# Patient Record
Sex: MALE | Race: OTHER | NOT HISPANIC OR LATINO | URBAN - METROPOLITAN AREA
[De-identification: names, ages, dates, MRNs, and addresses within clinical notes are randomized per-mention and may not be internally consistent; named-entity substitution may affect disease eponyms.]

---

## 2017-12-31 ENCOUNTER — EMERGENCY (EMERGENCY)
Facility: HOSPITAL | Age: 37
LOS: 1 days | Discharge: HOME | End: 2017-12-31
Admitting: EMERGENCY MEDICINE
Payer: MEDICAID

## 2017-12-31 VITALS
HEIGHT: 74 IN | TEMPERATURE: 98 F | OXYGEN SATURATION: 100 % | DIASTOLIC BLOOD PRESSURE: 88 MMHG | WEIGHT: 167.99 LBS | RESPIRATION RATE: 18 BRPM | HEART RATE: 82 BPM | SYSTOLIC BLOOD PRESSURE: 142 MMHG

## 2017-12-31 DIAGNOSIS — H57.9 UNSPECIFIED DISORDER OF EYE AND ADNEXA: ICD-10-CM

## 2017-12-31 DIAGNOSIS — B20 HUMAN IMMUNODEFICIENCY VIRUS [HIV] DISEASE: ICD-10-CM

## 2017-12-31 PROCEDURE — 99282 EMERGENCY DEPT VISIT SF MDM: CPT | Mod: 25

## 2017-12-31 NOTE — ED PROVIDER NOTE - PHYSICAL EXAMINATION
VITAL SIGNS: I have reviewed nursing notes and confirm.  CONSTITUTIONAL: Well-developed; well-nourished; in no acute distress.  SKIN: Skin is warm and dry, no acute rash.  HEAD: Normocephalic; atraumatic.  EYES: PERRL, EOM intact; conjunctiva and sclera clear. Otoscopic exam normal. no nicking or cupping. 20/20 vison in bilateral eyes on snellen chart from 4 feet away  ENT: No nasal discharge; airway clear.  NECK: Supple; non tender.  CARD: S1, S2 normal; no murmurs, gallops, or rubs. Regular rate and rhythm.  RESP: No wheezes, rales or rhonchi.  ABD: Normal bowel sounds; soft; non-distended; non-tender; no hepatosplenomegaly.  EXT: Normal ROM. No clubbing, cyanosis or edema.  NEURO: Alert, oriented. Grossly unremarkable. CNs intact. Normal Romberg. Normal finger to nose. No pronator drift. Normal rapid alternating movements/heal to shin. Sensation intact to all extremities. 5/5 strength to all extremities.   PSYCH: Cooperative, appropriate.

## 2017-12-31 NOTE — ED PROVIDER NOTE - OBJECTIVE STATEMENT
36 y/o M    PMHx: HIV -VL undetectable    Pt presents with c/o 1 day of noticing "a difference in the way my eyes reflect light when I look at them in the mirror". States "one reflect more light than the other and that never used to be the case". Denies any visual changes, blurring, eye pain, headache, gait/balance changes, foreign body in eye, eye trauma, discharge. Pt states he used to wear contacts for years- but has not worn them in a few years

## 2017-12-31 NOTE — ED ADULT NURSE NOTE - NS ED NURSE DC INFO COMPLEXITY
Simple: Patient demonstrates quick and easy understanding/Verbalized Understanding D/C instructions not given

## 2017-12-31 NOTE — ED PROVIDER NOTE - MEDICAL DECISION MAKING DETAILS
change in eye reflection? exam normal. pt to follow up with ophthalmology this week. urged to return if symptoms worsen or change

## 2019-04-19 ENCOUNTER — EMERGENCY (EMERGENCY)
Facility: HOSPITAL | Age: 39
LOS: 1 days | Discharge: HOME | End: 2019-04-19
Attending: EMERGENCY MEDICINE | Admitting: EMERGENCY MEDICINE
Payer: MEDICAID

## 2019-04-19 VITALS
RESPIRATION RATE: 18 BRPM | SYSTOLIC BLOOD PRESSURE: 125 MMHG | DIASTOLIC BLOOD PRESSURE: 88 MMHG | HEART RATE: 87 BPM | OXYGEN SATURATION: 100 % | TEMPERATURE: 98 F

## 2019-04-19 DIAGNOSIS — B20 HUMAN IMMUNODEFICIENCY VIRUS [HIV] DISEASE: ICD-10-CM

## 2019-04-19 PROCEDURE — 99283 EMERGENCY DEPT VISIT LOW MDM: CPT

## 2019-04-19 RX ORDER — ABACAVIR 20 MG/ML
600 SOLUTION ORAL ONCE
Qty: 0 | Refills: 0 | Status: DISCONTINUED | OUTPATIENT
Start: 2019-04-19 | End: 2019-04-23

## 2019-04-19 RX ORDER — LAMIVUDINE 150 MG
300 TABLET ORAL ONCE
Qty: 0 | Refills: 0 | Status: DISCONTINUED | OUTPATIENT
Start: 2019-04-19 | End: 2019-04-23

## 2019-04-19 NOTE — ED ADULT NURSE NOTE - CHPI ED NUR SYMPTOMS NEG
no vomiting/no fever/no nausea/no decreased eating/drinking/no tingling/no weakness/no pain/no chills/no dizziness

## 2019-04-19 NOTE — ED PROVIDER NOTE - CLINICAL SUMMARY MEDICAL DECISION MAKING FREE TEXT BOX
Pt with missed dose of HIV medication, Triumeq, with no other acute complaints. Will give Pt a dose of Abacavir 600mg and Lamivudine 300mg in ED and release to PD. Dolutegravir component of Pt's medication is not in stock in ED.

## 2019-04-19 NOTE — ED PROVIDER NOTE - OBJECTIVE STATEMENT
37 y/o male with PMHx of HIV, BIB EMS, accompanied by PD presents to the ED requesting for dose of HIV medication Triumeq . Pt states he takes his medication every day at 5pm and missed his dose today. He is also requesting for a record of his last visit in ED. No other complaints. Denies fever, chills.

## 2021-03-12 ENCOUNTER — EMERGENCY (EMERGENCY)
Facility: HOSPITAL | Age: 41
LOS: 1 days | Discharge: HOME | End: 2021-03-12
Attending: EMERGENCY MEDICINE | Admitting: EMERGENCY MEDICINE
Payer: MEDICAID

## 2021-03-12 VITALS
RESPIRATION RATE: 16 BRPM | OXYGEN SATURATION: 100 % | TEMPERATURE: 98 F | DIASTOLIC BLOOD PRESSURE: 76 MMHG | HEART RATE: 73 BPM | SYSTOLIC BLOOD PRESSURE: 101 MMHG | HEIGHT: 74 IN | WEIGHT: 169.98 LBS

## 2021-03-12 VITALS
OXYGEN SATURATION: 97 % | HEIGHT: 74 IN | TEMPERATURE: 98 F | RESPIRATION RATE: 18 BRPM | HEART RATE: 88 BPM | DIASTOLIC BLOOD PRESSURE: 78 MMHG | SYSTOLIC BLOOD PRESSURE: 115 MMHG | WEIGHT: 179.9 LBS

## 2021-03-12 DIAGNOSIS — B20 HUMAN IMMUNODEFICIENCY VIRUS [HIV] DISEASE: ICD-10-CM

## 2021-03-12 DIAGNOSIS — R46.89 OTHER SYMPTOMS AND SIGNS INVOLVING APPEARANCE AND BEHAVIOR: ICD-10-CM

## 2021-03-12 DIAGNOSIS — Z20.822 CONTACT WITH AND (SUSPECTED) EXPOSURE TO COVID-19: ICD-10-CM

## 2021-03-12 LAB
ALBUMIN SERPL ELPH-MCNC: 3.9 G/DL — SIGNIFICANT CHANGE UP (ref 3.4–5)
ALP SERPL-CCNC: 54 U/L — SIGNIFICANT CHANGE UP (ref 40–120)
ALT FLD-CCNC: 25 U/L — SIGNIFICANT CHANGE UP (ref 12–42)
ANION GAP SERPL CALC-SCNC: 9 MMOL/L — SIGNIFICANT CHANGE UP (ref 9–16)
ANISOCYTOSIS BLD QL: SLIGHT — SIGNIFICANT CHANGE UP
AST SERPL-CCNC: 28 U/L — SIGNIFICANT CHANGE UP (ref 15–37)
BASOPHILS # BLD AUTO: 0.06 K/UL — SIGNIFICANT CHANGE UP (ref 0–0.2)
BASOPHILS NFR BLD AUTO: 1.1 % — SIGNIFICANT CHANGE UP (ref 0–2)
BILIRUB SERPL-MCNC: 0.3 MG/DL — SIGNIFICANT CHANGE UP (ref 0.2–1.2)
BUN SERPL-MCNC: 18 MG/DL — SIGNIFICANT CHANGE UP (ref 7–23)
CALCIUM SERPL-MCNC: 9.2 MG/DL — SIGNIFICANT CHANGE UP (ref 8.5–10.5)
CHLORIDE SERPL-SCNC: 108 MMOL/L — SIGNIFICANT CHANGE UP (ref 96–108)
CO2 SERPL-SCNC: 28 MMOL/L — SIGNIFICANT CHANGE UP (ref 22–31)
CREAT SERPL-MCNC: 1.09 MG/DL — SIGNIFICANT CHANGE UP (ref 0.5–1.3)
ELLIPTOCYTES BLD QL SMEAR: SLIGHT — SIGNIFICANT CHANGE UP
EOSINOPHIL # BLD AUTO: 0.11 K/UL — SIGNIFICANT CHANGE UP (ref 0–0.5)
EOSINOPHIL NFR BLD AUTO: 2 % — SIGNIFICANT CHANGE UP (ref 0–6)
ETHANOL SERPL-MCNC: <3 MG/DL — SIGNIFICANT CHANGE UP
GLUCOSE SERPL-MCNC: 92 MG/DL — SIGNIFICANT CHANGE UP (ref 70–99)
HCT VFR BLD CALC: 37.7 % — LOW (ref 39–50)
HGB BLD-MCNC: 12 G/DL — LOW (ref 13–17)
IMM GRANULOCYTES NFR BLD AUTO: 0.4 % — SIGNIFICANT CHANGE UP (ref 0–1.5)
LYMPHOCYTES # BLD AUTO: 2.26 K/UL — SIGNIFICANT CHANGE UP (ref 1–3.3)
LYMPHOCYTES # BLD AUTO: 40.1 % — SIGNIFICANT CHANGE UP (ref 13–44)
MANUAL SMEAR VERIFICATION: SIGNIFICANT CHANGE UP
MCHC RBC-ENTMCNC: 23.8 PG — LOW (ref 27–34)
MCHC RBC-ENTMCNC: 31.8 GM/DL — LOW (ref 32–36)
MCV RBC AUTO: 74.7 FL — LOW (ref 80–100)
MONOCYTES # BLD AUTO: 0.48 K/UL — SIGNIFICANT CHANGE UP (ref 0–0.9)
MONOCYTES NFR BLD AUTO: 8.5 % — SIGNIFICANT CHANGE UP (ref 2–14)
NEUTROPHILS # BLD AUTO: 2.7 K/UL — SIGNIFICANT CHANGE UP (ref 1.8–7.4)
NEUTROPHILS NFR BLD AUTO: 47.9 % — SIGNIFICANT CHANGE UP (ref 43–77)
NRBC # BLD: 0 /100 WBCS — SIGNIFICANT CHANGE UP (ref 0–0)
PLAT MORPH BLD: NORMAL — SIGNIFICANT CHANGE UP
PLATELET # BLD AUTO: 246 K/UL — SIGNIFICANT CHANGE UP (ref 150–400)
POIKILOCYTOSIS BLD QL AUTO: SLIGHT — SIGNIFICANT CHANGE UP
POTASSIUM SERPL-MCNC: 3.8 MMOL/L — SIGNIFICANT CHANGE UP (ref 3.5–5.3)
POTASSIUM SERPL-SCNC: 3.8 MMOL/L — SIGNIFICANT CHANGE UP (ref 3.5–5.3)
PROT SERPL-MCNC: 7.3 G/DL — SIGNIFICANT CHANGE UP (ref 6.4–8.2)
RBC # BLD: 5.05 M/UL — SIGNIFICANT CHANGE UP (ref 4.2–5.8)
RBC # FLD: 13.8 % — SIGNIFICANT CHANGE UP (ref 10.3–14.5)
RBC BLD AUTO: ABNORMAL
SARS-COV-2 RNA SPEC QL NAA+PROBE: SIGNIFICANT CHANGE UP
SODIUM SERPL-SCNC: 145 MMOL/L — SIGNIFICANT CHANGE UP (ref 132–145)
TARGETS BLD QL SMEAR: SLIGHT — SIGNIFICANT CHANGE UP
TSH SERPL-MCNC: 0.93 UIU/ML — SIGNIFICANT CHANGE UP (ref 0.36–3.74)
WBC # BLD: 5.63 K/UL — SIGNIFICANT CHANGE UP (ref 3.8–10.5)
WBC # FLD AUTO: 5.63 K/UL — SIGNIFICANT CHANGE UP (ref 3.8–10.5)

## 2021-03-12 PROCEDURE — 99284 EMERGENCY DEPT VISIT MOD MDM: CPT

## 2021-03-12 PROCEDURE — 93010 ELECTROCARDIOGRAM REPORT: CPT

## 2021-03-12 PROCEDURE — 99283 EMERGENCY DEPT VISIT LOW MDM: CPT

## 2021-03-12 RX ORDER — IBUPROFEN 200 MG
600 TABLET ORAL ONCE
Refills: 0 | Status: COMPLETED | OUTPATIENT
Start: 2021-03-12 | End: 2021-03-12

## 2021-03-12 RX ADMIN — Medication 600 MILLIGRAM(S): at 20:29

## 2021-03-12 NOTE — ED PROVIDER NOTE - PHYSICAL EXAMINATION
Physical Exam  GEN: Awake, alert, non-toxic appearing, NCAT  RESP: no tachypnea, no hypoxia, no resp distress,  MSK: no deformity  SKIN: cap refill < 2 sec, pedal pulses palpable equal bl, no discoloration/erythema/cyanosis/gangrene/induration/fluctuance/crepitus/blisters

## 2021-03-12 NOTE — ED ADULT NURSE NOTE - OBJECTIVE STATEMENT
40y male presents to ED c/o headache. Pt is poor historian, rambling story about hotels and court systems, disconnected logic. Paranoid delusions. Pt denies psych hx but states he recently spent 10 days in a psych [facility] because "the doctors wanted to be mean to me." Pt mentioned hx of HIV. A&Ox4.

## 2021-03-12 NOTE — ED PROVIDER NOTE - NSFOLLOWUPINSTRUCTIONS_ED_ALL_ED_FT
Follow up with your primary care doctor or clinics listed below if you do [place] [address]  To make an appointment, call [phone]   Return immediately for any new or worsening symptoms or any new concerns

## 2021-03-12 NOTE — ED PROVIDER NOTE - PATIENT PORTAL LINK FT
You can access the FollowMyHealth Patient Portal offered by [facility] by registering at the following website: [email or web address]. By joining [employer]’s FollowMyHealth portal, you will also be able to view your health information using other applications (apps) compatible with our system.

## 2021-03-12 NOTE — ED PROVIDER NOTE - CARE PLAN
Principal Discharge DX:	Disorganized behavior   Principal Discharge DX:	Behavior symptom  Secondary Diagnosis:	Disorganized behavior

## 2021-03-12 NOTE — ED PROVIDER NOTE - PHYSICAL EXAMINATION
CONSTITUTIONAL: Well-appearing; well-nourished; in no apparent distress.   	HEAD: Normocephalic; atraumatic.   	EYES:  clear bilaterally  ENT: airway patent  Resp breathing comfortably with no distress  PSYCHOLOGICAL: The patient’s mood and manner are rambling, appears manic or high, unclear if this is his new baseline

## 2021-03-12 NOTE — ED ADULT NURSE REASSESSMENT NOTE - NS ED NURSE REASSESS COMMENT FT1
Pt demanding food, yelling at staff. Reminded pt that waiting blood work results and can give food shortly. Pt states, "I'd rather leave so I can get food. I came here for food." Pt walks out with steady gait.

## 2021-03-12 NOTE — ED PROVIDER NOTE - CLINICAL SUMMARY MEDICAL DECISION MAKING FREE TEXT BOX
Patient presenting with disorganized behavior. Unclear if this is drug or psych or both. Patient agreed to labs. Appears to be taking care of himself otherwise. Patient presenting with odd/disorganized behavior. Unclear if this is drug or psych or both. Patient agreed to labs. Appears to be taking care of himself otherwise. He stasyed for labs then refused to stay. he says that he has important things to do tomorrow. Dn appear disorganized enough to hold against his well.

## 2021-03-12 NOTE — ED PROVIDER NOTE - CLINICAL SUMMARY MEDICAL DECISION MAKING FREE TEXT BOX
bl toe pain after wearing new shoes, no evidence of trauma/infection/nv compromise, analgesia  shoes appear in good condition (sneakers)

## 2021-03-12 NOTE — ED PROVIDER NOTE - OBJECTIVE STATEMENT
40 yom pw bl toe pain, after walking in new shoes.  pt states he walked from [address] [place] [address] to [place].  no trauma.  no bleeding.  pain began today.

## 2021-03-12 NOTE — ED PROVIDER NOTE - OBJECTIVE STATEMENT
40 M came to triage to have his body and blood checked. He also wants his vital signs checked. 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 he is rambling and tangential. Says that he used to serve in   and that he has a PHD. 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 he says that he owns a building [address] but that he can't stay there.     He has HIV but is not sure if that is true as  "[place] lies". He denies mental health hx. He seems to deny drug use as well. He is grossly psychotic and looks very clean ie. dn look homeless. Online he appears to have a linked in profile that shows past  service and a master degree. 40 M came to triage to have his body and blood checked. He also wants his vital signs checked. 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 he is rambling and tangential. Says that he used to serve in   and that he has a PHD. 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 He says that he owns a building on [address] but that he can't stay there.     He has HIV but is not sure if that is true as  "[place] community lies". He denies mental health hx. He seems to deny drug use as well. He is grossly psychotic (,possibly chronically) vs. high on stimulants  and looks very clean ie. dn look homeless. Online he appears to have a linked in profile that shows past  service and a master degree.

## 2021-03-12 NOTE — ED ADULT NURSE NOTE - CHIEF COMPLAINT QUOTE
Pt with c/o bilateral foot pain. States he just bought new shoes and walked from [place] to [place].

## 2021-03-12 NOTE — ED PROVIDER NOTE - NSFOLLOWUPINSTRUCTIONS_ED_ALL_ED_FT
Please see your PMD/HIV PMD for ongoing care.     Please come back to the Er for medical emergencies or crisis.     [facility] is a good referral line for crisis and substance abuse help.

## 2021-03-12 NOTE — ED PROVIDER NOTE - PATIENT PORTAL LINK FT
You can access the FollowMyHealth Patient Portal offered by [facility] by registering at the following website: [email or web address]. By joining [employer]’s FollowMyHealth portal, you will also be able to view your health information using other applications (apps) compatible with our system.

## 2021-03-12 NOTE — ED ADULT TRIAGE NOTE - CHIEF COMPLAINT QUOTE
Pt walks in c/o of headache from "something in his blood." [facility] causing headache. Pt PMH of psych history, does not want to speak to telepsych. Pt A+Ox3.

## 2021-03-16 DIAGNOSIS — M79.675 PAIN IN LEFT TOE(S): ICD-10-CM

## 2021-03-16 DIAGNOSIS — M79.674 PAIN IN RIGHT TOE(S): ICD-10-CM

## 2021-03-16 DIAGNOSIS — M79.89 OTHER SPECIFIED SOFT TISSUE DISORDERS: ICD-10-CM

## 2021-07-18 ENCOUNTER — EMERGENCY (EMERGENCY)
Facility: HOSPITAL | Age: 41
LOS: 1 days | Discharge: HOME | End: 2021-07-18
Attending: EMERGENCY MEDICINE | Admitting: EMERGENCY MEDICINE
Payer: MEDICAID

## 2021-07-18 VITALS
HEIGHT: 74 IN | RESPIRATION RATE: 16 BRPM | HEART RATE: 64 BPM | WEIGHT: 175.05 LBS | SYSTOLIC BLOOD PRESSURE: 116 MMHG | TEMPERATURE: 98 F | OXYGEN SATURATION: 97 % | DIASTOLIC BLOOD PRESSURE: 73 MMHG

## 2021-07-18 DIAGNOSIS — F17.200 NICOTINE DEPENDENCE, UNSPECIFIED, UNCOMPLICATED: ICD-10-CM

## 2021-07-18 DIAGNOSIS — M79.675 PAIN IN LEFT TOE(S): ICD-10-CM

## 2021-07-18 DIAGNOSIS — M79.674 PAIN IN RIGHT TOE(S): ICD-10-CM

## 2021-07-18 DIAGNOSIS — Z59.0 HOMELESSNESS: ICD-10-CM

## 2021-07-18 PROCEDURE — 99283 EMERGENCY DEPT VISIT LOW MDM: CPT

## 2021-07-18 SDOH — ECONOMIC STABILITY - HOUSING INSECURITY: HOMELESSNESS: Z59.0

## 2021-07-19 RX ORDER — IBUPROFEN 200 MG
600 TABLET ORAL ONCE
Refills: 0 | Status: COMPLETED | OUTPATIENT
Start: 2021-07-19 | End: 2021-07-19

## 2021-07-19 RX ADMIN — Medication 600 MILLIGRAM(S): at 00:14

## 2021-07-19 NOTE — ED PROVIDER NOTE - NSFOLLOWUPINSTRUCTIONS_ED_ALL_ED_FT
Medicines: The following medicines may be ordered for you:   •Acetaminophen decreases pain. Ask how much to take and how often to take it. Follow directions. Acetaminophen can cause liver damage if not taken correctly.      •NSAIDs decrease pain and prevent swelling. Ask your healthcare provider which medicine is right for you. Ask how much to take and when to take it. Take as directed. NSAIDs can cause stomach bleeding and kidney problems if not taken correctly.       •Pain relief cream decreases pain. Use this cream as directed.      •Take your medicine as directed. Contact your healthcare provider if you think your medicine is not helping or if you have side effects. Tell him of her if you are allergic to any medicine. Keep a list of the medicines, vitamins, and herbs you take. Include the amounts, and when and why you take them. Bring the list or the pill bottles to follow-up visits. Carry your medicine list with you in case of an emergency.      Follow up with your healthcare provider or specialist as directed: Write down your questions so you remember to ask them during your visits.     Self-care:   •Apply heat to help decrease pain. Use a heating pad or heat wrap. Apply heat for 20 to 30 minutes every 2 hours for as many days as directed.       •Rest as much as possible. Avoid activities that cause joint pain.       •Apply ice to help decrease swelling and pain. Ice may also help prevent tissue damage. Use an ice pack, or put crushed ice in a plastic bag. Cover it with a towel and place it on your painful joint for 15 to 20 minutes every hour or as directed.       •Support the joint with a brace or elastic wrap as directed.       •Elevate your joint above the level of your heart as often as you can to help decrease swelling and pain. Prop your painful joint on pillows or blankets to keep it elevated comfortably.       •Lose weight if you are overweight. Extra weight can put pressure on your joints and cause more pain. Ask your healthcare provider how much you should weigh. Ask him to help you create a weight loss plan.       •Exercise regularly to help improve joint movement and to decrease pain. Ask about the best exercise plan for you. Low-impact exercises can help take the pressure off your joints. Examples are walking, swimming, and water aerobics.

## 2021-07-19 NOTE — ED PROVIDER NOTE - PHYSICAL EXAMINATION
CONSTITUTIONAL: Well appearing, well nourished, awake, alert, oriented to person, place, time/situation and in no apparent distress.  ENT: Airway patent, Nasal mucosa clear. Mouth with normal mucosa.  EYES: Clear bilaterally.  RESPIRATORY: Breathing comfortably with normal RR.  MSK: Range of motion is not limited, no deformities noted.  NEURO: Alert and oriented, no focal deficits.  EXT: Normal ROM. No cyanosis or edema. Non-ttp all ext, distal pulses intact  SKIN: Skin normal color for race, warm, dry and intact. No evidence of rash.  PSYCH: Alert and oriented to person, place, time/situation. normal mood and affect. no apparent risk to self or others.

## 2021-07-19 NOTE — ED PROVIDER NOTE - OBJECTIVE STATEMENT
39 y/o M p/w pain to b/l great toes for 2mo. No redness/swelling. No trauma. Pt is homeless and "on his feet" throughout the day.

## 2021-07-19 NOTE — ED PROVIDER NOTE - CLINICAL SUMMARY MEDICAL DECISION MAKING FREE TEXT BOX
no e/o infectious process, normal exam, likely overuse injury, supportive care measures and f/u outpt.

## 2021-07-19 NOTE — ED PROVIDER NOTE - PATIENT PORTAL LINK FT
You can access the FollowMyHealth Patient Portal offered by [facility] by registering at the following website: [email or web address]. By joining [employer]’s FollowMyHealth portal, you will also be able to view your health information using other applications (apps) compatible with our system.

## 2022-08-21 ENCOUNTER — EMERGENCY (EMERGENCY)
Facility: HOSPITAL | Age: 42
LOS: 1 days | Discharge: HOME | End: 2022-08-21
Admitting: EMERGENCY MEDICINE

## 2022-08-21 VITALS
TEMPERATURE: 98 F | WEIGHT: 168.65 LBS | SYSTOLIC BLOOD PRESSURE: 114 MMHG | HEART RATE: 77 BPM | RESPIRATION RATE: 18 BRPM | OXYGEN SATURATION: 99 % | DIASTOLIC BLOOD PRESSURE: 74 MMHG | HEIGHT: 74 IN

## 2022-08-21 PROCEDURE — 99283 EMERGENCY DEPT VISIT LOW MDM: CPT

## 2022-08-21 RX ORDER — IBUPROFEN 200 MG
600 TABLET ORAL ONCE
Refills: 0 | Status: COMPLETED | OUTPATIENT
Start: 2022-08-21 | End: 2022-08-21

## 2022-08-21 RX ORDER — BACITRACIN ZINC 500 UNIT/G
1 OINTMENT IN PACKET (EA) TOPICAL ONCE
Refills: 0 | Status: COMPLETED | OUTPATIENT
Start: 2022-08-21 | End: 2022-08-21

## 2022-08-21 RX ORDER — ACETAMINOPHEN 500 MG
650 TABLET ORAL ONCE
Refills: 0 | Status: COMPLETED | OUTPATIENT
Start: 2022-08-21 | End: 2022-08-21

## 2022-08-21 RX ADMIN — Medication 1 APPLICATION(S): at 10:30

## 2022-08-21 RX ADMIN — Medication 650 MILLIGRAM(S): at 10:30

## 2022-08-21 RX ADMIN — Medication 600 MILLIGRAM(S): at 10:30

## 2022-08-21 NOTE — ED PROVIDER NOTE - CLINICAL SUMMARY MEDICAL DECISION MAKING FREE TEXT BOX
40 y/o M presenting with an abrasion to the forehead. On exam, Pt appears well and in no acute distress. Plan for Bacitracin. Will discharge afterwards.
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
## 2022-08-21 NOTE — ED ADULT NURSE NOTE - NSFALLRSKINDICATORS_ED_ALL_ED
Render Post-Care Instructions In Note?: yes Detail Level: Detailed Consent: The patient's verbal consent was obtained including but not limited to risks of crusting, scabbing, blistering, scarring, darker or lighter pigmentary change, recurrence, incomplete removal and infection. Duration Of Freeze Thaw-Cycle (Seconds): 15 Number Of Freeze-Thaw Cycles: 2 freeze-thaw cycles Post-Care Instructions: I reviewed with the patient in detail post-care instructions. Patient is to wear sunprotection, and avoid picking at any of the treated lesions. Pt may apply Vaseline to crusted or scabbing areas. Total Number Of Aks Treated: 2 Number Of Freeze-Thaw Cycles: 1 freeze-thaw cycle no

## 2022-08-21 NOTE — ED PROVIDER NOTE - PATIENT PORTAL LINK FT
You can access the FollowMyHealth Patient Portal offered by [facility] by registering at the following website: [email or web address]. By joining [employer]’s FollowMyHealth portal, you will also be able to view your health information using other applications (apps) compatible with our system.
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
## 2022-08-22 DIAGNOSIS — S00.81XA ABRASION OF OTHER PART OF HEAD, INITIAL ENCOUNTER: ICD-10-CM

## 2022-08-22 DIAGNOSIS — B20 HUMAN IMMUNODEFICIENCY VIRUS [HIV] DISEASE: ICD-10-CM

## 2022-08-22 DIAGNOSIS — S09.90XA UNSPECIFIED INJURY OF HEAD, INITIAL ENCOUNTER: ICD-10-CM

## 2022-08-22 DIAGNOSIS — Z00.00 ENCOUNTER FOR GENERAL ADULT MEDICAL EXAMINATION WITHOUT ABNORMAL FINDINGS: ICD-10-CM
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
## 2023-08-25 ENCOUNTER — EMERGENCY (EMERGENCY)
Facility: HOSPITAL | Age: 43
LOS: 0 days | Discharge: HOME | End: 2023-08-25
Attending: EMERGENCY MEDICINE
Payer: MEDICAID

## 2023-08-25 VITALS
HEIGHT: 74 IN | SYSTOLIC BLOOD PRESSURE: 134 MMHG | DIASTOLIC BLOOD PRESSURE: 87 MMHG | RESPIRATION RATE: 16 BRPM | OXYGEN SATURATION: 99 % | TEMPERATURE: 98 F | HEART RATE: 62 BPM | WEIGHT: 171.96 LBS

## 2023-08-25 DIAGNOSIS — Z21 ASYMPTOMATIC HUMAN IMMUNODEFICIENCY VIRUS [HIV] INFECTION STATUS: ICD-10-CM

## 2023-08-25 DIAGNOSIS — K13.79 OTHER LESIONS OF ORAL MUCOSA: ICD-10-CM

## 2023-08-25 DIAGNOSIS — K14.0 GLOSSITIS: ICD-10-CM

## 2023-08-25 DIAGNOSIS — K12.1 OTHER FORMS OF STOMATITIS: ICD-10-CM

## 2023-08-25 PROCEDURE — 99283 EMERGENCY DEPT VISIT LOW MDM: CPT

## 2023-08-25 RX ORDER — CLOTRIMAZOLE 10 MG
1 TROCHE MUCOUS MEMBRANE
Qty: 1 | Refills: 0
Start: 2023-08-25 | End: 2023-08-31

## 2023-08-25 RX ORDER — FLUCONAZOLE 150 MG/1
1 TABLET ORAL
Qty: 8 | Refills: 0
Start: 2023-08-25 | End: 2023-09-01

## 2023-08-25 RX ORDER — DIPHENHYDRAMINE HYDROCHLORIDE AND LIDOCAINE HYDROCHLORIDE AND ALUMINUM HYDROXIDE AND MAGNESIUM HYDRO
10 KIT ONCE
Refills: 0 | Status: COMPLETED | OUTPATIENT
Start: 2023-08-25 | End: 2023-08-25

## 2023-08-25 RX ADMIN — DIPHENHYDRAMINE HYDROCHLORIDE AND LIDOCAINE HYDROCHLORIDE AND ALUMINUM HYDROXIDE AND MAGNESIUM HYDRO 10 MILLILITER(S): KIT at 09:07

## 2023-08-25 NOTE — ED PROVIDER NOTE - PHYSICAL EXAMINATION
CONSTITUTIONAL: NAD  SKIN: Warm dry  HEAD: NCAT  EYES: NL inspection  ENT: MMM  +2 ulcers visualized on LT inner lip with central clearing, +1 circular ulcer below tongu  NECK: Supple; non tender. No cervical lymphadenopathy   NEURO: Grossly unremarkable  PSYCH: Cooperative, appropriate.

## 2023-08-25 NOTE — ED PROVIDER NOTE - ATTENDING CONTRIBUTION TO CARE
42-year-old male PMH HIV presenting for evaluation of mouth sores for the past 2 days.  He denies any other associated complaints such as fever chills, weight loss, difficulty swallowing/ breathing, sore throat or any other additional problems.  Well-appearing male in no acute distress, PERRL, pink conjunctiva, MMM, normal oropharynx, normal phonation, there 2 shallow ulcers the left lower inner lip without any surrounding erythema, no lip swelling.  Likely aphthous ulcers or accidental bite marks.  Swish and spit solution provided to the patient advised to dab it on the lesions if painful, advised to follow-up with his doctor at the [facility] in [place] as needed in the next few days.  Patient verbalized understanding is amenable with the discharge plan.  Vital signs were reviewed. Strict return precautions given.

## 2023-08-25 NOTE — ED PROVIDER NOTE - OBJECTIVE STATEMENT
41 yo m ho HIV CD4 >600, undetectable viral load, [f[facility] presents with mouth sores x 2 days. Admits to painful ulcer on LT inner lip and RT below tongue. Denies painful swallowing, fevers, chills, nausea, vomiting

## 2023-08-25 NOTE — ED PROVIDER NOTE - CLINICAL SUMMARY MEDICAL DECISION MAKING FREE TEXT BOX
42-year-old male PMH HIV presenting for evaluation of mouth sores for the past 2 days.  He denies any other associated complaints such as fever chills, weight loss, difficulty swallowing/ breathing, sore throat or any other additional problems.  Well-appearing male in no acute distress, PERRL, pink conjunctiva, MMM, normal oropharynx, normal phonation, there 2 shallow ulcers the left lower inner lip without any surrounding erythema, no lip swelling.  Likely aphthous ulcers or accidental bite marks.  Swish and spit solution provided to the patient advised to dab it on the lesions if painful, advised to follow-up with his doctor at the [facility] in [place] as needed in the next few days.  Patient verbalized understanding is amenable with the discharge plan.  Vital signs were reviewed. Strict return precautions given.

## 2023-08-25 NOTE — ED PROVIDER NOTE - NSFOLLOWUPINSTRUCTIONS_ED_ALL_ED_FT
What is thrush?  Thrush is an infection that affects the mouth and throat. This type of infection is caused by a fungus called "Julia." Julia is a type of fungus called "yeast." For this reason, some people call thrush a yeast infection of the mouth and throat.    Anyone can get thrush. In adults, it is more common in people who:    ?Are older    ?Are taking antibiotics    ?Are taking inhaled steroid medicines    ?Have a weak immune system (for example, people being treated for cancer and people with AIDS)    The same kind of yeast that causes thrush can also cause vaginal yeast infections. In babies, it can cause diaper rash.    What are the symptoms of thrush?  Many people with thrush have no symptoms. When symptoms do occur, they can include:    ?White patches lining the cheeks or on the tongue, roof of the mouth, or back of the throat (picture 1)    ?Redness inside the mouth without white patches. (This happens in people who wear dentures, retainers, or mouthguards.)    ?Feeling like your mouth is filled with cotton    ?Pain with eating and swallowing    Should I see my doctor or nurse?  Yes. If you have symptoms of thrush, call your doctor or nurse for an appointment.    Is there a test for thrush?  Yes, but most people do not need it. Your doctor or nurse should be able to tell if you have thrush just by looking inside your mouth. To confirm, they might also run a cotton swab (Q-tip) along your tongue or cheek and collect some fluid. Then, they send the fluid to the lab and have it checked for yeast.    How is thrush treated?  People with thrush usually get a prescription mouth rinse or a lozenge that has medicine to kill the yeast. (A lozenge is like a candy that you suck on.) There is also a tablet that sticks to your gums. If these options do not work, people sometimes take a pill that has medicine to kill yeast. People who have severe infections or other health problems sometimes get the pill right away.    People who wear dentures must also clean their dentures really well every night.    Can I prevent thrush?  For most people, doctors and nurses do not give medicines to prevent thrush. The best way to prevent thrush is by keeping your mouth clean. To do this:    ?Brush your teeth and tongue at least 2 times each day with a soft toothbrush. Floss every night. Change your toothbrush as instructed.    ?Do not use over-the-counter mouthwashes. They can kill healthy bacteria in your mouth that can help fight thrush.    ?If you wear dentures, a mouthguard, or a retainer, it is really important to clean them every night and to give your mouth some time without the dentures, mouthguard, or retainer.    ?If you use a steroid inhaler, gargle and rinse your mouth with water after every time you use your inhaler.    ?If you have diabetes, try to keep your blood sugar under control.    ?If you smoke, try to stop. If you are having trouble quitting, your doctor or nurse can help.    When should I call the doctor?  Call for advice if:    ?You have a fever of 100.4°F (38°C) or higher.    ?You have trouble swallowing or are not able to eat or drink. Please apply to affect "magic mouthwash mix" to affected area 3x a day for symptom relief.  ~~~~  Medical Screening Exam       A medical screening exam helps determine whether or not you need immediate medical treatment. This type of exam may be done in the emergency department, an urgent care setting, or your health care provider's office.  During the exam, a health care provider does a short physical exam and asks about your medical history to assess:  •Your current symptoms.      •Your overall health.      Depending on your symptoms, you may need additional tests.      What are the possible outcomes of a medical screening exam?  Your medical screening exam may determine that:  •You do not need emergency treatment at this time.      •You need treatment right away.      •You need to be transferred to another medical center.      •You need to have more tests.      A medical specialist may be consulted if necessary.      When should I seek medical care?    If you have a regular health care provider, make an appointment for a follow-up visit with him or her. If you do not have a regular health care provider, ask about resources in your community.      Get help right away if:    •Your condition gets worse or you develop new or troubling symptoms before you see your health care provider. If this occurs, go to an emergency department right away.      In an emergency:     •Call 911 or have someone drive you to the nearest hospital.      • Do not drive yourself.        Summary    •A medical screening exam helps determine whether or not you need immediate medical treatment.      •During the exam, a health care provider does a short physical exam and asks about your current symptoms and overall health.      •More tests may be ordered during the exam.      •You may need to be transferred to another medical center.

## 2023-08-25 NOTE — ED ADULT TRIAGE NOTE - CHIEF COMPLAINT QUOTE
Pt complaining of mouth sores since yesterday. Pt denies fever. Airway patent. Pt speaking in full sentences

## 2023-08-25 NOTE — ED PROVIDER NOTE - PATIENT PORTAL LINK FT
You can access the FollowMyHealth Patient Portal offered by [facility] by registering at the following website: [email or web address]. By joining [employer]’s FollowMyHealth portal, you will also be able to view your health information using other applications (apps) compatible with our system.

## 2024-05-17 ENCOUNTER — EMERGENCY (EMERGENCY)
Facility: HOSPITAL | Age: 44
LOS: 1 days | Discharge: LEFT BEFORE DISCHARGE | End: 2024-05-17
Attending: EMERGENCY MEDICINE | Admitting: EMERGENCY MEDICINE
Payer: MEDICAID

## 2024-05-17 VITALS
HEART RATE: 90 BPM | RESPIRATION RATE: 16 BRPM | DIASTOLIC BLOOD PRESSURE: 70 MMHG | TEMPERATURE: 98 F | OXYGEN SATURATION: 98 % | SYSTOLIC BLOOD PRESSURE: 127 MMHG

## 2024-05-17 DIAGNOSIS — S71.102D UNSPECIFIED OPEN WOUND, LEFT THIGH, SUBSEQUENT ENCOUNTER: ICD-10-CM

## 2024-05-17 DIAGNOSIS — Z53.21 PROCEDURE AND TREATMENT NOT CARRIED OUT DUE TO PATIENT LEAVING PRIOR TO BEING SEEN BY HEALTH CARE PROVIDER: ICD-10-CM

## 2024-05-17 PROCEDURE — L9991: CPT

## 2024-05-17 NOTE — ED ADULT TRIAGE NOTE - CHIEF COMPLAINT QUOTE
Wound care/ suture removal on left thigh. Pt aggressive in triage, not further coming with reason for visit. requesting food

## 2024-08-26 NOTE — ED ADULT TRIAGE NOTE - AS PAIN REST
You can access the FollowMyHealth Patient Portal offered by [facility] by registering at the following website: [email or web address]. By joining [employer]’s FollowMyHealth portal, you will also be able to view your health information using other applications (apps) compatible with our system.
2 (mild pain)

## 2024-09-24 NOTE — ED ADULT TRIAGE NOTE - WEIGHT IN KG
[employer] Alert Outreach Telephone Call Attempt     Patient is being outreached by the Care Transitions Program for a clinical alert from the [employer] monitoring program.     Call Attempt Date: 9/24/2024    Call Attempt: First Attempt   
[employer] [facility] Alert Outreach Telephone Call Attempt     Patient is being outreached by the Care Transitions Program for a clinical alert from the [employer] monitoring program.     Call Attempt Date: 9/24/2024    Call Attempt: Second Attempt   
77.1

## 2024-12-19 ENCOUNTER — EMERGENCY (EMERGENCY)
Facility: HOSPITAL | Age: 44
LOS: 0 days | Discharge: HOME | End: 2024-12-19
Attending: STUDENT IN AN ORGANIZED HEALTH CARE EDUCATION/TRAINING PROGRAM
Payer: OTHER GOVERNMENT

## 2024-12-19 VITALS
SYSTOLIC BLOOD PRESSURE: 123 MMHG | DIASTOLIC BLOOD PRESSURE: 84 MMHG | RESPIRATION RATE: 18 BRPM | OXYGEN SATURATION: 100 % | TEMPERATURE: 98 F | HEART RATE: 66 BPM | WEIGHT: 175.05 LBS

## 2024-12-19 PROCEDURE — 0225U NFCT DS DNA&RNA 21 SARSCOV2: CPT

## 2024-12-19 PROCEDURE — 99283 EMERGENCY DEPT VISIT LOW MDM: CPT

## 2024-12-19 RX ORDER — ACETAMINOPHEN 500MG 500 MG/1
650 TABLET, COATED ORAL ONCE
Refills: 0 | Status: COMPLETED | OUTPATIENT
Start: 2024-12-19 | End: 2024-12-19

## 2024-12-19 RX ADMIN — ACETAMINOPHEN 500MG 650 MILLIGRAM(S): 500 TABLET, COATED ORAL at 21:22

## 2024-12-19 NOTE — ED ADULT NURSE NOTE - NSFALLUNIVINTERV_ED_ALL_ED
Bed/Stretcher in lowest position, wheels locked, appropriate side rails in place/Call bell, personal items and telephone in reach/Instruct patient to call for assistance before getting out of bed/chair/stretcher/Non-slip footwear applied when patient is off stretcher/[employer] to call system/Physically safe environment - no spills, clutter or unnecessary equipment/Purposeful proactive rounding/Room/bathroom lighting operational, light cord in reach

## 2024-12-19 NOTE — ED PROVIDER NOTE - OBJECTIVE STATEMENT
44-year-old male with past medical history of HIV, syphilis, hepatitis presenting for 1 day of runny nose.  Patient denies all other symptoms including fevers, chills, nausea, vomiting, chest pain, shortness of breath, abdominal pain, diarrhea, constipation.  Is interested in viral swab.  Denies recent travel or sick contacts.  [facility] [place] [facility], last viral load undetectable this past month.

## 2024-12-19 NOTE — ED PROVIDER NOTE - CLINICAL SUMMARY MEDICAL DECISION MAKING FREE TEXT BOX
pt with URI symptoms,    swabbed for viruses    Appropriate medications for patient's presenting complaints were ordered and effects were reassessed. Patient's external records were reviewed    Escalation to admission and/or observation was considered.  Patient feels much better and is comfortable with discharge.  Appropriate follow-up was arranged.

## 2024-12-19 NOTE — ED PROVIDER NOTE - PATIENT PORTAL LINK FT
You can access the FollowMyHealth Patient Portal offered by [facility] by registering at the following website: [email or web address]. By joining [employer]’s FollowMyHealth portal, you will also be able to view your health information using other applications (apps) compatible with our system.

## 2024-12-21 LAB
RAPID RVP RESULT: SIGNIFICANT CHANGE UP
SARS-COV-2 RNA SPEC QL NAA+PROBE: SIGNIFICANT CHANGE UP

## 2024-12-22 ENCOUNTER — EMERGENCY (EMERGENCY)
Facility: HOSPITAL | Age: 44
LOS: 0 days | Discharge: HOME | End: 2024-12-22
Attending: EMERGENCY MEDICINE
Payer: OTHER GOVERNMENT

## 2024-12-22 VITALS
TEMPERATURE: 98 F | SYSTOLIC BLOOD PRESSURE: 123 MMHG | HEART RATE: 54 BPM | RESPIRATION RATE: 16 BRPM | DIASTOLIC BLOOD PRESSURE: 76 MMHG | OXYGEN SATURATION: 100 %

## 2024-12-22 DIAGNOSIS — Z01.89 ENCOUNTER FOR OTHER SPECIFIED SPECIAL EXAMINATIONS: ICD-10-CM

## 2024-12-22 DIAGNOSIS — Z11.3 ENCOUNTER FOR SCREENING FOR INFECTIONS WITH A PREDOMINANTLY SEXUAL MODE OF TRANSMISSION: ICD-10-CM

## 2024-12-22 DIAGNOSIS — Z21 ASYMPTOMATIC HUMAN IMMUNODEFICIENCY VIRUS [HIV] INFECTION STATUS: ICD-10-CM

## 2024-12-22 PROBLEM — Z86.19 PERSONAL HISTORY OF OTHER INFECTIOUS AND PARASITIC DISEASES: Chronic | Status: ACTIVE | Noted: 2024-12-19

## 2024-12-22 LAB
FLUAV AG NPH QL: SIGNIFICANT CHANGE UP
FLUBV AG NPH QL: SIGNIFICANT CHANGE UP
RSV RNA NPH QL NAA+NON-PROBE: SIGNIFICANT CHANGE UP
SARS-COV-2 RNA SPEC QL NAA+PROBE: SIGNIFICANT CHANGE UP

## 2024-12-22 PROCEDURE — 99284 EMERGENCY DEPT VISIT MOD MDM: CPT

## 2024-12-22 PROCEDURE — 0241U: CPT

## 2024-12-22 PROCEDURE — 99283 EMERGENCY DEPT VISIT LOW MDM: CPT

## 2024-12-22 PROCEDURE — 87591 N.GONORRHOEAE DNA AMP PROB: CPT

## 2024-12-22 PROCEDURE — 87491 CHLMYD TRACH DNA AMP PROBE: CPT

## 2024-12-22 PROCEDURE — 87661 TRICHOMONAS VAGINALIS AMPLIF: CPT

## 2024-12-22 NOTE — ED PROVIDER NOTE - NSFOLLOWUPINSTRUCTIONS_ED_ALL_ED_FT
COVID  You were seen and evaluated in the Emergency Department. You have been diagnosed with having the coronavirus disease 2019 (COVID-19). As of today, you are well enough to manage your illness at home. Please monitor your symptokms closely as you may have to return for re-evaluation. It is recommended that you monitor your oxygen level at home.   Once you get home, you should rest, stay hydrated, and get plenty of sleep. You may continue to feel body aches, fatigue and/or loss of taste and smell for several days, which are common after a serious viral illness. If your symptoms worsen, call your doctor or seek immediate medical attention.    You MUST self-isolate (quarantine) at home until you can stop self-isolating after these 3 things  have happened:  1. You have not had a fever (temperature < 100.0? Fahrenheit or < 38.0? Celsius) consistently  for at least 72 hours without taking fever reducing medications (e.g. aspirin, acetaminophen),  AND  2. Your respiratory symptoms are improving; AND  3. At least 7 days have passed since your illness started  When returning home, please follow the precautions below:  WHO (people & pets to take into consideration)  ? Self-isolate (quarantine) is mandatory, until you meet all 3 criteria stated above.  ? Be especially cautious if there are elderly people or anyone with significant medical issues in  your home as these groups may have more severe symptoms from this infection.  ? Do not handle pets or other animals while sick.  WHERE (guidelines for maintaining safe distances)  ? Stay at home except to get medical care.  ? Do not go to work, school, or public areas; avoid using public transportation, ride-sharing, or  taxis.  ? As much as possible, you should stay in a specific room and away from other people in your  home. If available, you should use a separate bathroom  ? As advised by the Centers for Disease Control (CDC), you must stay in your home and  minimize contact with others to avoid spreading the infection.  Discharge Instructions  Patients Who have Tested Positive for COVID-19    WHAT (objects/surfaces to take into consideration)  ? Avoid sharing personal household items. You should not share dishes, drinking glasses, cups,  eating utensils, towels, or bedding with other people or pets in your home. After using these  items, wash them thoroughly with soap and water.  ? Clean all “high-touch” surfaces every day. High-touch surfaces include counters, tabletops,  doorknobs, bathroom fixtures, toilets, phones, keyboards, tablets, and bedside tables. Also,  clean any surfaces that may have blood, stool or bodily fluids on them.  ? Use a household cleaning spray or wipe to clean high-touch surfaces; follow the label  instructions.  HOW (guidelines for hand washing and germ management)  ? Clean your hands often. Wash your hands with soap and water for at least 20 seconds. If soap  and water are not available, clean your hands with an alcohol-based hand  that  contains at least 60% alcohol, covering all surfaces of your hands and rubbing them together  until they feel dry. It is preferred that you use soap and water should if hands are visibly dirty.  ? Avoid touching your eyes, nose, and mouth with unwashed hands.  ? Cover your mouth and nose with a tissue when you cough or sneeze.  ? Throw used tissues in a lined trash can. Immediately wash your hands with soap and water  or hand .  WHEN (guidelines for seeking medical care)  ? Take your temperature several times a days  ? Call your doctor if you start to feel worse (increased congestion, chest pain, coughing or  fevers).  ? Seek emergency medical treatment if you have: difficulty breathing, low oxygen reading, weakness, you are unable to eat, chest pain, uncontrolled symptoms despite oral medications    A sexually transmitted disease (STD) is a disease or infection that may be passed (transmitted) from person to person, usually during sexual activity. This may happen by way of saliva, semen, blood, vaginal mucus, or urine. Symptoms vary depending on the type of STD acquired and may include pain in the groin, discharge, and lesions or a rash. If you are started on an antibiotic, take it exactly as instructed. Avoid sexual contact of any kind until cleared by a health care professional. Contact your sexual partner(s) to inform them of your diagnosis so that they may be tested and treated as well.    SEEK IMMEDIATE MEDICAL CARE IF YOU HAVE ANY OF THE FOLLOWING SYMPTOMS: severe abdominal pain, high fever, nausea/vomiting, or unintended weight loss.

## 2024-12-22 NOTE — ED PROVIDER NOTE - PHYSICAL EXAMINATION
CONSTITUTIONAL: NAD  SKIN: Warm dry  HEAD: NCAT  EYES: NL inspection  NEURO: Grossly unremarkable  PSYCH: Cooperative, appropriate.

## 2024-12-22 NOTE — ED PROVIDER NOTE - CHIEF COMPLAINT
Message forwarded.   Letter typed.   The patient is a 44y Male complaining of see chief complaint quote.

## 2024-12-22 NOTE — ED PROVIDER NOTE - CLINICAL SUMMARY MEDICAL DECISION MAKING FREE TEXT BOX
Patient presented requesting COVID swab and STD testing as documented. No other active complaints and patient otherwise asymptomatic. Afebrile, HD stable, exam unremarkable. Labs sent. Given the above, will discharge home with outpatient follow up. Patient agreeable with plan. Agrees to return to ED for any new or worsening symptoms.

## 2024-12-22 NOTE — ED PROVIDER NOTE - OBJECTIVE STATEMENT
44-year-old male past medical history HIV, syphilis, hepatitis coming in for STD testing (Undetectable viral load) and COVID swab.  Patient reports he is sexually active with male, had questionable exposure last week with another male.  Denies any symptoms.  Just wants to get tested.  Denies any acute complaints.

## 2024-12-22 NOTE — ED PROVIDER NOTE - PATIENT PORTAL LINK FT
You can access the FollowMyHealth Patient Portal offered by [facility] by registering at the following website: [email or web address]. By joining [employer]’s FollowMyHealth portal, you will also be able to view your health information using other applications (apps) compatible with our system.

## 2024-12-23 LAB
C TRACH RRNA SPEC QL NAA+PROBE: SIGNIFICANT CHANGE UP
N GONORRHOEA RRNA SPEC QL NAA+PROBE: SIGNIFICANT CHANGE UP
SPECIMEN SOURCE: SIGNIFICANT CHANGE UP
SPECIMEN SOURCE: SIGNIFICANT CHANGE UP

## 2024-12-24 LAB — T VAGINALIS RRNA SPEC QL NAA+PROBE: SIGNIFICANT CHANGE UP
